# Patient Record
Sex: FEMALE | URBAN - METROPOLITAN AREA
[De-identification: names, ages, dates, MRNs, and addresses within clinical notes are randomized per-mention and may not be internally consistent; named-entity substitution may affect disease eponyms.]

---

## 2020-11-13 ENCOUNTER — OFFICE VISIT (OUTPATIENT)
Dept: URBAN - METROPOLITAN AREA CLINIC 23 | Facility: CLINIC | Age: 80
End: 2020-11-13
Payer: MEDICARE

## 2020-11-13 DIAGNOSIS — H31.091 OTHER CHORIORETINAL SCARS, RIGHT EYE: ICD-10-CM

## 2020-11-13 PROCEDURE — 92004 COMPRE OPH EXAM NEW PT 1/>: CPT | Performed by: OPTOMETRIST

## 2020-11-13 ASSESSMENT — VISUAL ACUITY
OD: 20/40
OS: 20/80

## 2020-11-13 ASSESSMENT — INTRAOCULAR PRESSURE
OS: 12
OD: 12

## 2020-11-13 NOTE — IMPRESSION/PLAN
Impression: Other chorioretinal scars, right eye: H31.091. Plan: Previous RD repair with laser. Attached. Permanent distortion.

## 2020-11-13 NOTE — IMPRESSION/PLAN
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 Plan: Cataracts account for the patient's complaints. Patient understands changing glasses will not improve vision. Patient desires to have surgery, recommend surgical consult. Briefly discussed advanced technology options. Recommend dist IOL OS. Discussed may need YAG post operatively at some point.

## 2020-12-02 ENCOUNTER — PRE-OPERATIVE VISIT (OUTPATIENT)
Dept: URBAN - METROPOLITAN AREA CLINIC 23 | Facility: CLINIC | Age: 80
End: 2020-12-02
Payer: MEDICARE

## 2020-12-02 DIAGNOSIS — H25.812 COMBINED FORMS OF AGE-RELATED CATARACT, LEFT EYE: Primary | ICD-10-CM

## 2020-12-02 PROCEDURE — 76519 ECHO EXAM OF EYE: CPT | Performed by: OPHTHALMOLOGY

## 2020-12-02 ASSESSMENT — PACHYMETRY
OD: 24.23
OS: 2.76
OD: 4.87
OS: 23.28

## 2020-12-16 ENCOUNTER — OFFICE VISIT (OUTPATIENT)
Dept: URBAN - METROPOLITAN AREA CLINIC 23 | Facility: CLINIC | Age: 80
End: 2020-12-16
Payer: MEDICARE

## 2020-12-16 PROCEDURE — 92004 COMPRE OPH EXAM NEW PT 1/>: CPT | Performed by: OPHTHALMOLOGY

## 2020-12-16 ASSESSMENT — INTRAOCULAR PRESSURE
OD: 13
OS: 13

## 2020-12-16 NOTE — IMPRESSION/PLAN
Impression: Combined forms of age-related cataract, left eye: H25.812. Condition: established, worsening. Symptoms: could improve with surgery. Plan: Cataract accounts for patient's complaints. Reviewed risks, benefits, and procedure. Patient desires surgery, schedule ce/iol OS, standard lens, distance refractive target, patient is clear for surgery in [place] [id number].

## 2020-12-23 ENCOUNTER — POST-OPERATIVE VISIT (OUTPATIENT)
Dept: URBAN - METROPOLITAN AREA CLINIC 23 | Facility: CLINIC | Age: 80
End: 2020-12-23
Payer: MEDICARE

## 2020-12-23 ENCOUNTER — SURGERY (OUTPATIENT)
Dept: URBAN - METROPOLITAN AREA SURGERY 11 | Facility: SURGERY | Age: 80
End: 2020-12-23
Payer: MEDICARE

## 2020-12-23 DIAGNOSIS — Z96.1 PRESENCE OF INTRAOCULAR LENS: Primary | ICD-10-CM

## 2020-12-23 PROCEDURE — 99024 POSTOP FOLLOW-UP VISIT: CPT | Performed by: OPTOMETRIST

## 2020-12-23 PROCEDURE — 66984 XCAPSL CTRC RMVL W/O ECP: CPT | Performed by: OPHTHALMOLOGY

## 2020-12-23 ASSESSMENT — INTRAOCULAR PRESSURE
OS: 25
OD: 19
OS: 20
OD: 19
OS: 20
OS: 25

## 2020-12-23 NOTE — IMPRESSION/PLAN
Impression: S/P Cataract Extraction by phacoemulsification with IOL placement and ORA OS - . Presence of intraocular lens  Z96.1. Post operative instructions reviewed - Condition is improving - Plan: --Taper Pred-Moxi-Nepaf QID x 1 wk, TID x 1 wk, BID x 1wk, QD x 1wk, then d/c
--Advised patient to use artificial tears for comfort.

## 2020-12-30 ENCOUNTER — POST-OPERATIVE VISIT (OUTPATIENT)
Dept: URBAN - METROPOLITAN AREA CLINIC 23 | Facility: CLINIC | Age: 80
End: 2020-12-30
Payer: MEDICARE

## 2020-12-30 PROCEDURE — 99024 POSTOP FOLLOW-UP VISIT: CPT | Performed by: OPTOMETRIST

## 2020-12-30 ASSESSMENT — INTRAOCULAR PRESSURE
OS: 15
OD: 15
OS: 35

## 2020-12-30 NOTE — IMPRESSION/PLAN
Impression: S/P Cataract Extraction by phacoemulsification with IOL placement and ORA OS - 7 Days. Presence of intraocular lens  Z96.1. Plan: Continue medication as directed. Return to clinic in 3 weeks.

## 2021-01-20 ENCOUNTER — POST-OPERATIVE VISIT (OUTPATIENT)
Dept: URBAN - METROPOLITAN AREA CLINIC 23 | Facility: CLINIC | Age: 81
End: 2021-01-20

## 2021-01-20 PROCEDURE — 99024 POSTOP FOLLOW-UP VISIT: CPT | Performed by: OPTOMETRIST

## 2021-01-20 ASSESSMENT — INTRAOCULAR PRESSURE
OS: 12
OD: 12

## 2021-01-20 ASSESSMENT — VISUAL ACUITY
OD: 20/60
OS: 20/25 -3

## 2021-01-20 NOTE — IMPRESSION/PLAN
Impression: S/P Cataract Extraction by phacoemulsification with IOL placement and ORA OS - 28 Days. Presence of intraocular lens  Z96.1. Plan: --Discontinue Pred-Moxi-Nepaf QD after today.